# Patient Record
Sex: FEMALE | Race: WHITE
[De-identification: names, ages, dates, MRNs, and addresses within clinical notes are randomized per-mention and may not be internally consistent; named-entity substitution may affect disease eponyms.]

---

## 2018-10-02 ENCOUNTER — HOSPITAL ENCOUNTER (OUTPATIENT)
Dept: HOSPITAL 95 - LAB EV | Age: 73
Discharge: HOME | End: 2018-10-02
Attending: PHYSICIAN ASSISTANT
Payer: MEDICARE

## 2018-10-02 DIAGNOSIS — J32.9: Primary | ICD-10-CM

## 2018-10-02 LAB
ALBUMIN SERPL BCP-MCNC: 3.8 G/DL (ref 3.4–5)
ALBUMIN/GLOB SERPL: 1.1 {RATIO} (ref 0.8–1.8)
ALT SERPL W P-5'-P-CCNC: 21 U/L (ref 12–78)
ANION GAP SERPL CALCULATED.4IONS-SCNC: 13 MMOL/L (ref 6–16)
AST SERPL W P-5'-P-CCNC: 19 U/L (ref 12–37)
BASOPHILS # BLD AUTO: 0.04 K/MM3 (ref 0–0.23)
BASOPHILS NFR BLD AUTO: 1 % (ref 0–2)
BILIRUB SERPL-MCNC: 0.3 MG/DL (ref 0.1–1)
BUN SERPL-MCNC: 14 MG/DL (ref 8–24)
CALCIUM SERPL-MCNC: 9.4 MG/DL (ref 8.5–10.1)
CHLORIDE SERPL-SCNC: 104 MMOL/L (ref 98–108)
CO2 SERPL-SCNC: 24 MMOL/L (ref 21–32)
CREAT SERPL-MCNC: 0.81 MG/DL (ref 0.4–1)
DEPRECATED RDW RBC AUTO: 49.2 FL (ref 35.1–46.3)
EOSINOPHIL # BLD AUTO: 0.23 K/MM3 (ref 0–0.68)
EOSINOPHIL NFR BLD AUTO: 3 % (ref 0–6)
ERYTHROCYTE [DISTWIDTH] IN BLOOD BY AUTOMATED COUNT: 14.5 % (ref 11.7–14.2)
GLOBULIN SER CALC-MCNC: 3.5 G/DL (ref 2.2–4)
GLUCOSE SERPL-MCNC: 91 MG/DL (ref 70–99)
HCT VFR BLD AUTO: 40.5 % (ref 33–51)
HGB BLD-MCNC: 13.1 G/DL (ref 11.5–16)
IMM GRANULOCYTES # BLD AUTO: 0.02 K/MM3 (ref 0–0.1)
IMM GRANULOCYTES NFR BLD AUTO: 0 % (ref 0–1)
LYMPHOCYTES # BLD AUTO: 2.09 K/MM3 (ref 0.84–5.2)
LYMPHOCYTES NFR BLD AUTO: 29 % (ref 21–46)
MCHC RBC AUTO-ENTMCNC: 32.3 G/DL (ref 31.5–36.5)
MCV RBC AUTO: 93 FL (ref 80–100)
MONOCYTES # BLD AUTO: 0.58 K/MM3 (ref 0.16–1.47)
MONOCYTES NFR BLD AUTO: 8 % (ref 4–13)
NEUTROPHILS # BLD AUTO: 4.35 K/MM3 (ref 1.96–9.15)
NEUTROPHILS NFR BLD AUTO: 60 % (ref 41–73)
NRBC # BLD AUTO: 0 K/MM3 (ref 0–0.02)
NRBC BLD AUTO-RTO: 0 /100 WBC (ref 0–0.2)
PLATELET # BLD AUTO: 326 K/MM3 (ref 150–400)
POTASSIUM SERPL-SCNC: 3.8 MMOL/L (ref 3.5–5.5)
PROT SERPL-MCNC: 7.3 G/DL (ref 6.4–8.2)
SODIUM SERPL-SCNC: 141 MMOL/L (ref 136–145)

## 2019-01-07 ENCOUNTER — HOSPITAL ENCOUNTER (OUTPATIENT)
Dept: HOSPITAL 95 - ER | Age: 74
Setting detail: OBSERVATION
LOS: 1 days | Discharge: HOME | End: 2019-01-08
Attending: HOSPITALIST | Admitting: HOSPITALIST
Payer: MEDICARE

## 2019-01-07 VITALS — HEIGHT: 62.99 IN | BODY MASS INDEX: 29.22 KG/M2 | WEIGHT: 164.91 LBS

## 2019-01-07 DIAGNOSIS — I82.442: ICD-10-CM

## 2019-01-07 DIAGNOSIS — Z88.5: ICD-10-CM

## 2019-01-07 DIAGNOSIS — J32.9: ICD-10-CM

## 2019-01-07 DIAGNOSIS — I82.412: Primary | ICD-10-CM

## 2019-01-07 DIAGNOSIS — I82.432: ICD-10-CM

## 2019-01-07 DIAGNOSIS — Z88.8: ICD-10-CM

## 2019-01-07 DIAGNOSIS — I82.492: ICD-10-CM

## 2019-01-07 DIAGNOSIS — Z88.1: ICD-10-CM

## 2019-01-07 LAB
ALBUMIN SERPL BCP-MCNC: 3.7 G/DL (ref 3.4–5)
ALBUMIN/GLOB SERPL: 1 {RATIO} (ref 0.8–1.8)
ALT SERPL W P-5'-P-CCNC: 16 U/L (ref 12–78)
ANION GAP SERPL CALCULATED.4IONS-SCNC: 8 MMOL/L (ref 6–16)
AST SERPL W P-5'-P-CCNC: 10 U/L (ref 12–37)
BASOPHILS # BLD AUTO: 0.04 K/MM3 (ref 0–0.23)
BASOPHILS NFR BLD AUTO: 1 % (ref 0–2)
BILIRUB SERPL-MCNC: 0.5 MG/DL (ref 0.1–1)
BUN SERPL-MCNC: 9 MG/DL (ref 8–24)
CALCIUM SERPL-MCNC: 9.1 MG/DL (ref 8.5–10.1)
CHLORIDE SERPL-SCNC: 108 MMOL/L (ref 98–108)
CO2 SERPL-SCNC: 24 MMOL/L (ref 21–32)
CREAT SERPL-MCNC: 0.67 MG/DL (ref 0.4–1)
DEPRECATED RDW RBC AUTO: 47.1 FL (ref 35.1–46.3)
EOSINOPHIL # BLD AUTO: 0.25 K/MM3 (ref 0–0.68)
EOSINOPHIL NFR BLD AUTO: 3 % (ref 0–6)
ERYTHROCYTE [DISTWIDTH] IN BLOOD BY AUTOMATED COUNT: 13.3 % (ref 11.7–14.2)
GLOBULIN SER CALC-MCNC: 3.7 G/DL (ref 2.2–4)
GLUCOSE SERPL-MCNC: 94 MG/DL (ref 70–99)
HCT VFR BLD AUTO: 42 % (ref 33–51)
HGB BLD-MCNC: 13.6 G/DL (ref 11.5–16)
IMM GRANULOCYTES # BLD AUTO: 0.03 K/MM3 (ref 0–0.1)
IMM GRANULOCYTES NFR BLD AUTO: 0 % (ref 0–1)
LYMPHOCYTES # BLD AUTO: 2.22 K/MM3 (ref 0.84–5.2)
LYMPHOCYTES NFR BLD AUTO: 27 % (ref 21–46)
MCHC RBC AUTO-ENTMCNC: 32.4 G/DL (ref 31.5–36.5)
MCV RBC AUTO: 96 FL (ref 80–100)
MONOCYTES # BLD AUTO: 0.7 K/MM3 (ref 0.16–1.47)
MONOCYTES NFR BLD AUTO: 9 % (ref 4–13)
NEUTROPHILS # BLD AUTO: 4.97 K/MM3 (ref 1.96–9.15)
NEUTROPHILS NFR BLD AUTO: 61 % (ref 41–73)
NRBC # BLD AUTO: 0 K/MM3 (ref 0–0.02)
NRBC BLD AUTO-RTO: 0 /100 WBC (ref 0–0.2)
PLATELET # BLD AUTO: 229 K/MM3 (ref 150–400)
POTASSIUM SERPL-SCNC: 3.8 MMOL/L (ref 3.5–5.5)
PROT SERPL-MCNC: 7.4 G/DL (ref 6.4–8.2)
PROTHROMBIN TIME: 10.1 SEC (ref 9.7–11.5)
SODIUM SERPL-SCNC: 140 MMOL/L (ref 136–145)

## 2019-01-07 PROCEDURE — G0378 HOSPITAL OBSERVATION PER HR: HCPCS

## 2019-01-08 LAB — PROTHROMBIN TIME: 10.6 SEC (ref 9.7–11.5)

## 2019-01-08 NOTE — NUR
SHIFT SUMMARY
PT ARRIVED TO UNIT EARLIER IN THE SHIFT. DVT TO L LEG; L LEG IS SLIGHTLY MORE
SWOLLEN THAN THE RIGHT, SLIGHTLY RED. PT DENIES PAIN; JUST C/O PRESSURE,
"TIGHTNESS". L LEG ELEVATED ON TWO PILLOWS. A&O X 4, ABLE TO AMBULATE
INDEPEDENTLY. PT PROVIDED WITH EDUCATIONAL HAND OUTS. DENIES OSB, CHEST
PAIN/PRESSURE, OR ANY DISCOMFORT. WILL CONT TO MONITOR AND PROVIDE CARE UNTIL
PRESUMED BY ONCOMING.

## 2019-01-08 NOTE — NUR
EDUCATION HAND-OUTS
PROVIDED PT WITH SEVERAL PRINT-OFF EDUCATION HAND OUTS, AS WELL AS VERBAL
EDUCATION. THIS INCLUDED: FOODS c VIT K TO AVOID WHILE ON COUMADIN, LOVENOX
EDUCATION, DVT EDUCATION, ELIQUIS EDUCATION, AND XARELTO EDUCATION. PT DID NOT
SHOW ANY LEARNING BARRIERS.

## 2019-01-08 NOTE — NUR
PATIENT DISCHARGE:
PATIENT DISCHARGED TO HOME THIS SHIFT. MEDICATION RECONCILIATION COMPLETED;
MED LIST FAXED TO [facility]. DISCHARGE EDUCATION COMPLETED WITH PATIENT AND
SPOUSE. PATIENT TRANSPORTED TO EXIT BY [facility] STAFF WITH WHEELCHAIR AT 1835.
PATIENT DEPARTED [facility] VIA PRIVATE AUTO.

## 2019-08-02 ENCOUNTER — HOSPITAL ENCOUNTER (OUTPATIENT)
Dept: HOSPITAL 95 - LAB EV | Age: 74
Discharge: HOME | End: 2019-08-02
Attending: INTERNAL MEDICINE
Payer: MEDICARE

## 2019-08-02 DIAGNOSIS — D50.8: Primary | ICD-10-CM

## 2019-08-02 DIAGNOSIS — D62: ICD-10-CM

## 2019-08-06 ENCOUNTER — HOSPITAL ENCOUNTER (OUTPATIENT)
Dept: HOSPITAL 95 - LAB EV | Age: 74
Discharge: HOME | End: 2019-08-06
Attending: PHYSICIAN ASSISTANT
Payer: MEDICARE

## 2019-08-06 DIAGNOSIS — Z79.01: Primary | ICD-10-CM

## 2019-08-06 DIAGNOSIS — D64.9: ICD-10-CM

## 2019-08-06 DIAGNOSIS — Z51.81: ICD-10-CM

## 2019-08-06 LAB
BASOPHILS # BLD AUTO: 0.03 K/MM3
BASOPHILS NFR BLD AUTO: 0 %
DEPRECATED RDW RBC AUTO: 47 FL
EOSINOPHIL # BLD AUTO: 0.29 K/MM3
EOSINOPHIL NFR BLD AUTO: 3 %
ERYTHROCYTE [DISTWIDTH] IN BLOOD BY AUTOMATED COUNT: 18.3 %
HCT VFR BLD AUTO: 30.1 %
HGB BLD-MCNC: 8.9 G/DL
IMM GRANULOCYTES # BLD AUTO: 0.04 K/MM3
IMM GRANULOCYTES NFR BLD AUTO: 1 %
LYMPHOCYTES # BLD AUTO: 2.04 K/MM3
LYMPHOCYTES NFR BLD AUTO: 24 %
MCHC RBC AUTO-ENTMCNC: 29.6 G/DL
MCV RBC AUTO: 78 FL
MONOCYTES # BLD AUTO: 0.73 K/MM3
MONOCYTES NFR BLD AUTO: 9 %
NEUTROPHILS # BLD AUTO: 5.29 K/MM3
NEUTROPHILS NFR BLD AUTO: 63 %
NRBC # BLD AUTO: 0 K/MM3
NRBC BLD AUTO-RTO: 0 /100 WBC
PLATELET # BLD AUTO: 458 K/MM3
PROTHROMBIN TIME: 10.3 SEC